# Patient Record
Sex: FEMALE | Race: WHITE | Employment: OTHER | URBAN - METROPOLITAN AREA
[De-identification: names, ages, dates, MRNs, and addresses within clinical notes are randomized per-mention and may not be internally consistent; named-entity substitution may affect disease eponyms.]

---

## 2017-12-12 ENCOUNTER — HOSPITAL ENCOUNTER (OUTPATIENT)
Dept: PHYSICAL THERAPY | Age: 72
Discharge: HOME | End: 2017-12-12
Payer: MEDICARE

## 2017-12-12 PROCEDURE — G8984 CARRY CURRENT STATUS: HCPCS

## 2017-12-12 PROCEDURE — 97110 THERAPEUTIC EXERCISES: CPT

## 2017-12-12 PROCEDURE — G8985 CARRY GOAL STATUS: HCPCS

## 2017-12-12 PROCEDURE — 97162 PT EVAL MOD COMPLEX 30 MIN: CPT

## 2017-12-12 NOTE — PROGRESS NOTES
[address] [facility] [place] PHYSICAL THERAPY  [facility], [address] ,[address], [address]  Phone: [phone]  Fax: [phone] / [address] [address] PHYSICAL THERAPY SERVICES  Patient Name: Dama Litten : 1945   Medical   Diagnosis: Right shoulder pain [M25.511]  Left shoulder pain [M25.512] Treatment Diagnosis: B shoulder pain   Onset Date: 2017     Referral Source: Daniella Arevalo MD  [facility]): 2017   Prior Hospitalization: See medical history Provider #: [id number]   Prior Level of Function: Manageable sx with ADLs   Comorbidities: HTN, current h/o L wrist pain    Medications: Verified on Patient Summary List   The Plan of Care and following information is based on the information from the initial evaluation.   ==================================================================================  Assessment / key information:  Patient is a 81 y.o. female who presents to [facility] with Dx of B shoulder pain (she is RHD). Patient reports chronic h/o B shoulder pain which were of unknown etiology. She reports worsening of R shoulder sx after an incident on a plane when she was putting her bag into an overhead bin. 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 Sx improve with rest.  She reports significant difficulty with sleeping at night uninterrupted > 1.5 hours & typically sleeps upright on her soft for comfort. She also suffered from a recent L wrist sprain given difficulty pushing up from her bed using her shoulder. Average reported pain level at 4/10, 9/10 at worst & 0/10 at best. Upon objective evaluation patient demonstrates flex/scaption: 120 deg R, 98 o L, ABD 75 on R, 55 on L, IR to level of L4 bilaterally.   She demonstrates grossly 75% PROM of B shoulder with ERP noted throughout all planes. Unable to assess MMT of flexion/ABD in standing due to pain although ER/IR in supine was grossly 4+/5 and pain-free. Elbow MMT was 5/5 & pain-free bilaterally. Impingement tests were (+) bilaterally for sx reproduction with mod TTP along bicipital groove R>L. Fair/poor tolerance to supinelying for assessment today given c/o pain with this positioning. Patient can benefit from PT interventions to improve strength, decrease pain & improve posture to facilitate ADLs & overall functional status.   ==================================================================================   Eval Complexity: History MEDIUM  Complexity : 1-2 comorbidities / personal factors will impact the outcome/ POC ;  Examination  HIGH Complexity : 4+ Standardized tests and measures addressing body structure, function, activity limitation and / or participation in recreation ; Presentation MEDIUM Complexity : Evolving with changing characteristics ; Decision Making MEDIUM Complexity : FOTO score of 26-74; Overall Complexity MEDIUM  Problem List: pain affecting function, decrease ROM, decrease strength, decrease ADL/ functional abilitiies, decrease activity tolerance, decrease flexibility/ joint mobility, decrease transfer abilities and other FOTO 47 points   Treatment Plan may include any combination of the following: Therapeutic exercise, Therapeutic activities, Neuromuscular re-education, Physical agent/modality, Manual therapy, Aquatic therapy, Patient education, Self Care training, Functional mobility training, Home safety training and Other: DN prn  Patient / Family readiness to learn indicated by: asking questions, trying to perform skills and interest  Persons(s) to be included in education: patient (P)  Barriers to Learning/Limitations: None  Measures taken:    Patient Goal (s): \"prepare for surgery\"   Patient self reported health status: good  Rehabilitation Potential: good   Short Term Goals:  To be accomplished in  2  weeks:  1) Establish HEP to prevent further disability. 2) Patient will report decreased c/o pain to < or = 3/10 to facilitate dressing with manageable sx in B shoulders. 3) Improve FOTO score from 47 points to > or = 55 points indicating improved tolerance with ADLs in regards to B shoulders.  Long Term Goals: To be accomplished in  4  weeks:  1) Improve FOTO score from 55 points to > or = 65 points indicating improved tolerance with ADLs in regards to B shoulders. 2) Improve B shoulder AROM for flex/scaption to 130 degrees so ROM is available for dressing activities and/or overhead reaching. 3) Improve overall strength of B shoulder ABD/flex to 5-/5 so strength is available for return to light lifting activities at work/home. 4) Patient to report 50% improvement with her ability to tolerate supinelying with manageable sx in B shoulders. Frequency / Duration:   Patient to be seen  2-3  times per week for 4  weeks:  Patient / Caregiver education and instruction: self care, activity modification, brace/ splint application and exercises  G-Codes (GP): Carry [id number] Current  CK= 40-59%    Goal  CJ= 20-39%. The severity rating is based on the FOTO Score    Therapist Signature: DORA Matthew cert MDT Date: 50/12/1563   Certification Period: 12-12-17 to 3-10-18 Time: 3:14 PM   ===========================================================================================  I certify that the above Physical Therapy Services are being furnished while the patient is under my care. I agree with the treatment plan and certify that this therapy is necessary. Physician Signature:        Date:       Time:     Please sign and return to [facility] or you may fax the signed copy to [phone]. Thank you.

## 2017-12-13 NOTE — PROGRESS NOTES
PHYSICAL THERAPY - DAILY TREATMENT NOTE    Patient Name: Rin Miramontes        Date: 2017  : 1945   YES Patient  Verified  Visit #:     Insurance: Payor: [place] MEDICARE / Plan: VA MEDICARE PART A & B / Product Type: Medicare /      In time: 3:10 P Out time: 4 P   Total Treatment Time: 50     Medicare Time Tracking (below)   Total Timed Codes (min):  50 1:1 Treatment Time:  50     TREATMENT AREA =  Right shoulder pain [M25.511]  Left shoulder pain [M25.512]    SUBJECTIVE  Pain Level (on 0 to 10 scale):  3  / 10   Medication Changes/New allergies or changes in medical history, any new surgeries or procedures? NO    If yes, update Summary List   Subjective Functional Status/Changes:  []  No changes reported     See POC          OBJECTIVE  Modalities Rationale: PD   min [] Estim, type/location:                                      []  att     []  unatt     []  w/US     []  w/ice    []  w/heat    min []  Mechanical Traction: type/lbs                   []  pro   []  sup   []  int   []  cont    []  before manual    []  after manual    min []  Ultrasound, settings/location:      min []  Iontophoresis w/ dexamethasone, location:                                               []  take home patch       []  in clinic    min []  Ice     []  Heat    location/position:     min []  Vasopneumatic Device, press/temp:     min []  Other:    [] Skin assessment post-treatment (if applicable):    []  intact    []  redness- no adverse reaction     []redness  adverse reaction:        10 min Therapeutic Exercise:  [x]  See flow sheet   Rationale:      increase ROM and increase strength to improve the patients ability to return to light lifting      min Manual Therapy:    Rationale:          min Therapeutic Activity:    Rationale:         min Neuromuscular Re-ed:    Rationale:       min Gait Training:    Rationale:       min Patient Education:  YES  Reviewed HEP   []  Progressed/Changed HEP based on:         Other Objective/Functional Measures:    Pt educated on post capsule stretch & demonstrated placement/positioning in semi-reclined position with pillows supported to improve sleeping tolerance     Post Treatment Pain Level (on 0 to 10) scale:   3  / 10     ASSESSMENT  Assessment/Changes in Function:     See POC     []  See Progress Note/Recertification   Patient will continue to benefit from skilled PT services to modify and progress therapeutic interventions, address functional mobility deficits, address ROM deficits, address strength deficits and instruct in home and community integration to attain remaining goals.    Progress toward goals / Updated goals:    Progressing towards goals established at [address] [facility] [address]  [x]  Upgrade activities as tolerated YES Continue plan of care   []  Discharge due to :    []  Other:      Therapist: Karuna Wade PT    Date: 12/12/2017 Time: 8:01 PM     Future Appointments  Date Time Provider Earnest Barry   12/19/2017 11:00 AM Karuna Wade PT [facility]   12/22/2017 9:30 AM Nichole Stone [facility]   12/26/2017 9:00 AM Karuna Wade PT [facility]   12/28/2017 9:30 AM Karuna Wade PT [facility]   1/2/2018 10:30 AM Karuna Wade PT [facility]   1/5/2018 1:00 PM Karuna Wade PT [facility]   1/9/2018 12:00 PM Karuna Wade PT [facility]   1/12/2018 1:00 PM Alberta Keith, PT [facility]

## 2017-12-19 ENCOUNTER — HOSPITAL ENCOUNTER (OUTPATIENT)
Dept: PHYSICAL THERAPY | Age: 72
Discharge: HOME | End: 2017-12-19
Payer: MEDICARE

## 2017-12-19 PROCEDURE — 97110 THERAPEUTIC EXERCISES: CPT

## 2017-12-19 NOTE — PROGRESS NOTES
PHYSICAL THERAPY - DAILY TREATMENT NOTE    Patient Name: Juliane Bamberger        Date: 2017  : 1945   YES Patient  Verified  Visit #:      12  Insurance: Payor: [place] MEDICARE / Plan: VA MEDICARE PART A & B / Product Type: Medicare /      In time: 11:05 A Out time: 12:00 P   Total Treatment Time: 50     Medicare Time Tracking (below)   Total Timed Codes (min):  40 1:1 Treatment Time:  40     TREATMENT AREA =  Right shoulder pain [M25.511]  Left shoulder pain [M25.512]    SUBJECTIVE  Pain Level (on 0 to 10 scale):  3-8  / 10   Medication Changes/New allergies or changes in medical history, any new surgeries or procedures? NO    If yes, update Summary List   Subjective Functional Status/Changes:  []  No changes reported     Patient reports consult with Dr. Jacqueline Samuel was RS for tomorrow.           OBJECTIVE  Modalities Rationale:     decrease pain to improve patient's ability to return to pain-free use of B shoulders with ADLs   min [] Estim, type/location:                                      []  att     []  unatt     []  w/US     []  w/ice    []  w/heat    min []  Mechanical Traction: type/lbs                   []  pro   []  sup   []  int   []  cont    []  before manual    []  after manual    min []  Ultrasound, settings/location:      min []  Iontophoresis w/ dexamethasone, location:                                               []  take home patch       []  in clinic   10 min [x]  Ice     []  Heat    location/position: Seated to B shoulders     min []  Vasopneumatic Device, press/temp:     min []  Other:    [] Skin assessment post-treatment (if applicable):    []  intact    []  redness- no adverse reaction     []redness  adverse reaction:        40 min Therapeutic Exercise:  [x]  See flow sheet   Rationale:      increase ROM and increase strength to improve the patients ability to return to sx free ADLs      min Manual Therapy:    Rationale:          min Therapeutic Activity:    Rationale: min Neuromuscular Re-ed:    Rationale:       min Gait Training:    Rationale:       min Patient Education:  YES  Reviewed HEP   []  Progressed/Changed HEP based on: Other Objective/Functional Measures:    Initiated TE per flow sheet     Post Treatment Pain Level (on 0 to 10) scale:   3  / 10     ASSESSMENT  Assessment/Changes in Function:     Fair tolerance to TE, pt unable to tolerate any AAROM with dowel in supine as well as S/L ABD or 1/2 FR/towel stretch, all limited by pain B shoulders     []  See Progress Note/Recertification   Patient will continue to benefit from skilled PT services to modify and progress therapeutic interventions, address functional mobility deficits, address ROM deficits, address strength deficits, assess and modify postural abnormalities and instruct in home and community integration to attain remaining goals.    Progress toward goals / Updated goals:    Progressing towards goals established at [address] [facility] [address]  [x]  Upgrade activities as tolerated YES Continue plan of care   []  Discharge due to :    []  Other:      Therapist: Alex Mayen PT    Date: 12/19/2017 Time: 11:51 AM     Future Appointments  Date Time Provider Earnest Barry   12/21/2017 11:00 AM Alex Mayen, PT [facility]   12/26/2017 9:00 AM Alex Mayen, PT [facility]   12/28/2017 9:30 AM Alex Mayen, PT [facility]   1/2/2018 10:30 AM Alex Mayen, PT [facility]   1/5/2018 1:00 PM Alex Mayen PT [facility]   1/9/2018 12:00 PM Alex Mayen, PT [facility]   1/12/2018 1:00 PM Alberta Murdock, PT [facility]

## 2017-12-21 ENCOUNTER — HOSPITAL ENCOUNTER (OUTPATIENT)
Dept: PHYSICAL THERAPY | Age: 72
Discharge: HOME | End: 2017-12-21
Payer: MEDICARE

## 2017-12-21 PROCEDURE — 97110 THERAPEUTIC EXERCISES: CPT

## 2017-12-21 NOTE — PROGRESS NOTES
PHYSICAL THERAPY - DAILY TREATMENT NOTE    Patient Name: Katie Corona        Date: 2017  : 1945   YES Patient  Verified  Visit #:   3   of   12  Insurance: Payor: Sindhu Beans / Plan: VA MEDICARE PART A & B / Product Type: Medicare /      In time: 11 A Out time: 12:05 P   Total Treatment Time: 55     Medicare Time Tracking (below)   Total Timed Codes (min):  45 1:1 Treatment Time:  40     TREATMENT AREA =  Right shoulder pain [M25.511]  Left shoulder pain [M25.512]    SUBJECTIVE  Pain Level (on 0 to 10 scale):  6  / 10 L, 3/10 on R   Medication Changes/New allergies or changes in medical history, any new surgeries or procedures? NO    If yes, update Summary List   Subjective Functional Status/Changes:  []  No changes reported     Patient reports that she had a consult with Dr. Teri Montejo & will have an injection guiding by X-ray.            OBJECTIVE  Modalities Rationale:     decrease pain to improve patient's ability to return to pain-free use of B shoulders with dressing   min [] Estim, type/location:                                      []  att     []  unatt     []  w/US     []  w/ice    []  w/heat    min []  Mechanical Traction: type/lbs                   []  pro   []  sup   []  int   []  cont    []  before manual    []  after manual    min []  Ultrasound, settings/location:      min []  Iontophoresis w/ dexamethasone, location:                                               []  take home patch       []  in clinic   10 min [x]  Ice     []  Heat    location/position: Seated to B shoulders     min []  Vasopneumatic Device, press/temp:     min []  Other:    [] Skin assessment post-treatment (if applicable):    []  intact    []  redness- no adverse reaction     []redness  adverse reaction:        45/  40 min Therapeutic Exercise:  [x]  See flow sheet   Rationale:      increase ROM and increase strength to improve the patients ability to return to light lifting      min Manual Therapy:    Rationale: min Therapeutic Activity:    Rationale:         min Neuromuscular Re-ed:    Rationale:       min Gait Training:    Rationale:       min Patient Education:  YES  Reviewed HEP   []  Progressed/Changed HEP based on: Other Objective/Functional Measures: Added pulleys & finger ladder, instructions in [employer] for pain relief     Post Treatment Pain Level (on 0 to 10) scale:   4 / 10     ASSESSMENT  Assessment/Changes in Function:     Improved tolerance to TE with AAROM today      []  See Progress Note/Recertification   Patient will continue to benefit from skilled PT services to modify and progress therapeutic interventions, address functional mobility deficits, address ROM deficits, address strength deficits, assess and modify postural abnormalities and instruct in home and community integration to attain remaining goals.    Progress toward goals / Updated goals:    Progressing towards STG 2     PLAN  [x]  Upgrade activities as tolerated YES Continue plan of care   []  Discharge due to :    []  Other:      Therapist: Alvin Freire PT    Date: 12/21/2017 Time: 2:24 PM     Future Appointments  Date Time Provider Earnest Barry   12/26/2017 12:30 PM Mandie Olivera [facility]   12/28/2017 9:30 AM Alvin Freire PT [facility]   1/2/2018 10:30 AM Alvin Freire PT [facility]   1/5/2018 1:00 PM Alvin Freire PT [facility]   1/9/2018 12:00 PM Alvin Freire PT [facility]   1/12/2018 1:00 PM Alberta Kaminski, PT [facility]

## 2017-12-22 ENCOUNTER — APPOINTMENT (OUTPATIENT)
Dept: PHYSICAL THERAPY | Age: 72
End: 2017-12-22
Payer: MEDICARE

## 2017-12-26 ENCOUNTER — APPOINTMENT (OUTPATIENT)
Dept: PHYSICAL THERAPY | Age: 72
End: 2017-12-26
Payer: MEDICARE

## 2017-12-28 ENCOUNTER — APPOINTMENT (OUTPATIENT)
Dept: PHYSICAL THERAPY | Age: 72
End: 2017-12-28
Payer: MEDICARE

## 2018-01-02 ENCOUNTER — HOSPITAL ENCOUNTER (OUTPATIENT)
Dept: PHYSICAL THERAPY | Age: 73
Discharge: HOME | End: 2018-01-02
Payer: MEDICARE

## 2018-01-02 PROCEDURE — 97110 THERAPEUTIC EXERCISES: CPT | Performed by: PHYSICAL THERAPIST

## 2018-01-02 NOTE — PROGRESS NOTES
PHYSICAL THERAPY - DAILY TREATMENT NOTE    Patient Name: Mark Duncan        Date: 2018  : 1945   YES Patient  Verified  Visit #:   3   of   12  Insurance: Payor: Christopher Mins / Plan: VA MEDICARE PART A & B / Product Type: Medicare /      In time: 10:25 Out time: 11:20   Total Treatment Time: 50     Medicare Time Tracking (below)   Total Timed Codes (min):  25 1:1 Treatment Time:  25     TREATMENT AREA = Right shoulder pain [M25.511]  Left shoulder pain [M25.512]    SUBJECTIVE  Pain Level (on 0 to 10 scale):  4-5  / 10   Medication Changes/New allergies or changes in medical history, any new surgeries or procedures? NO    If yes, update Summary List   Subjective Functional Status/Changes:  []  No changes reported     Pt reports having an outpatient procedeure where fluid was injected into her left shoulder under flouroscopy over about a 30 minute span to stretch adhesions last Tuesday. She has been sore, but notes increased ROM the past week.           OBJECTIVE  Modalities Rationale:     decrease edema, decrease inflammation and decrease pain to improve patient's ability to prevent soreness   min [] Estim, type/location:                                      []  att     []  unatt     []  w/US     []  w/ice    []  w/heat    min []  Mechanical Traction: type/lbs                   []  pro   []  sup   []  int   []  cont    []  before manual    []  after manual    min []  Ultrasound, settings/location:      min []  Iontophoresis w/ dexamethasone, location:                                               []  take home patch       []  in clinic   10 min [x]  Ice     []  Heat    location/position: B shoulders - seated after treatment    min []  Vasopneumatic Device, press/temp:     min []  Other:    [] Skin assessment post-treatment (if applicable):    []  intact    []  redness- no adverse reaction     []redness  adverse reaction:        40(25) min Therapeutic Exercise:  [x]  See flow sheet   Rationale: increase ROM, increase strength and improve coordination to improve the patients ability to use UEs without residual pain/soreness      min Therapeutic Activity:         min Neuromuscular Re-ed:         min Gait Training:       min Patient Education:  YES  Reviewed HEP   []  Progressed/Changed HEP based on: Other Objective/Functional Measures: Added supine bilateral shoulder flexion stretch with interlocked fingers     Post Treatment Pain Level (on 0 to 10) scale:   0  / 10     ASSESSMENT  Assessment/Changes in Function:     Pt notes having increased ROM in her shoulder after recent procedure, and tolerated new flexion stretch well.         []  See Progress Note/Recertification   Patient will continue to benefit from skilled PT services to modify and progress therapeutic interventions, address functional mobility deficits, address ROM deficits, address strength deficits, analyze and address soft tissue restrictions, analyze and cue movement patterns, analyze and modify body mechanics/ergonomics and assess and modify postural abnormalities to attain remaining goals. Progress toward goals / Updated goals: Will cotinue to progress ROM and strength as tolerated.      PLAN  [x]  Upgrade activities as tolerated YES Continue plan of care   []  Discharge due to :    []  Other:      Therapist: Justin Severs, PT    Date: 1/2/2018 Time: 10:04 AM     Future Appointments  Date Time Provider Earnest Barry   1/2/2018 10:30 AM Justin Severs, [place] [facility]   1/5/2018 1:00 PM Leonora Shoulders, [place] [facility]   1/9/2018 12:00 PM Leonora Shoulders, PT [facility]   1/12/2018 1:00 PM Leonora Shoulders, PT [facility]

## 2018-01-05 ENCOUNTER — APPOINTMENT (OUTPATIENT)
Dept: PHYSICAL THERAPY | Age: 73
End: 2018-01-05
Payer: MEDICARE

## 2018-01-09 ENCOUNTER — APPOINTMENT (OUTPATIENT)
Dept: PHYSICAL THERAPY | Age: 73
End: 2018-01-09
Payer: MEDICARE

## 2018-01-12 ENCOUNTER — HOSPITAL ENCOUNTER (OUTPATIENT)
Dept: PHYSICAL THERAPY | Age: 73
Discharge: HOME | End: 2018-01-12
Payer: MEDICARE

## 2018-01-12 PROCEDURE — 97110 THERAPEUTIC EXERCISES: CPT

## 2018-01-15 NOTE — PROGRESS NOTES
PHYSICAL THERAPY - DAILY TREATMENT NOTE    Patient Name: Emilie Maloney        Date: 2018  : 1945   YES Patient  Verified  Visit #:     Insurance: Payor: VA MEDICARE / Plan: VA MEDICARE PART A & B / Product Type: Medicare /      In time: 1:00 P Out time: 2:00 P   Total Treatment Time: 55     Medicare Time Tracking (below)   Total Timed Codes (min):  45 1:1 Treatment Time:  25     TREATMENT AREA =  Right shoulder pain [M25.511]  Left shoulder pain [M25.512]    SUBJECTIVE  Pain Level (on 0 to 10 scale):  1  / 10 on L, 6/10 on R shoulder    Medication Changes/New allergies or changes in medical history, any new surgeries or procedures? NO    If yes, update Summary List   Subjective Functional Status/Changes:  []  No changes reported     Patient reports her L shoulder has been feeling much better since her shot.            OBJECTIVE  Modalities Rationale:     decrease pain to improve patient's ability to return to pain-free lifting   min [] Estim, type/location:                                      []  att     []  unatt     []  w/US     []  w/ice    []  w/heat    min []  Mechanical Traction: type/lbs                   []  pro   []  sup   []  int   []  cont    []  before manual    []  after manual    min []  Ultrasound, settings/location:      min []  Iontophoresis w/ dexamethasone, location:                                               []  take home patch       []  in clinic   10 min [x]  Ice     []  Heat    location/position: B shoulders in sitting    min []  Vasopneumatic Device, press/temp:     min []  Other:    [] Skin assessment post-treatment (if applicable):    []  intact    []  redness- no adverse reaction     []redness  adverse reaction:        45/  25 min Therapeutic Exercise:  [x]  See flow sheet   Rationale:      increase ROM and increase strength to improve the patients ability to return to light lifting at home      min Manual Therapy:    Rationale:          min Therapeutic Activity:    Rationale:         min Neuromuscular Re-ed:    Rationale:       min Gait Training:    Rationale:       min Patient Education:  YES  Reviewed HEP   []  Progressed/Changed HEP based on: Other Objective/Functional Measures:    Unable to tolerance R shoulder ER/ABD in S/L due to pain      Post Treatment Pain Level (on 0 to 10) scale:   0  / 10 on L, 3/10 on R     ASSESSMENT  Assessment/Changes in Function:     Fair tolerance to TE on R shoulder, limited by pain, good sx reduction on L shoulder with recent injection      []  See Progress Note/Recertification   Patient will continue to benefit from skilled PT services to modify and progress therapeutic interventions, address functional mobility deficits, address ROM deficits, address strength deficits, assess and modify postural abnormalities and instruct in home and community integration to attain remaining goals.    Progress toward goals / Updated goals:    Progressing towards STG 2, 3     PLAN  [x]  Upgrade activities as tolerated YES Continue plan of care   []  Discharge due to :    [x]  Other: Re-assess for PN n/v     Therapist: Sotero Marshall PT    Date: 1/12/2018 Time: 2:00 PM     Future Appointments  Date Time Provider Earnest Barry   1/19/2018 10:30 AM Sotero Marshall PT [facility]   1/23/2018 11:00 AM Sotero Marshall PT [facility]   1/25/2018 4:00 PM Sotero Marshall PT [facility]   1/30/2018 10:30 AM Sotero Marshall PT [facility]   2/2/2018 1:00 PM Sotero Marsahll PT [facility]

## 2018-01-19 ENCOUNTER — HOSPITAL ENCOUNTER (OUTPATIENT)
Dept: PHYSICAL THERAPY | Age: 73
Discharge: HOME | End: 2018-01-19
Payer: MEDICARE

## 2018-01-19 PROCEDURE — 97110 THERAPEUTIC EXERCISES: CPT

## 2018-01-19 PROCEDURE — G8984 CARRY CURRENT STATUS: HCPCS

## 2018-01-19 PROCEDURE — G8985 CARRY GOAL STATUS: HCPCS

## 2018-01-19 NOTE — PROGRESS NOTES
[address]  [facility]  [facility]  [facility], [address] ,[address], [address]  Phone: [phone]  Fax: [phone] OF CARE/RECERTIFICATION FOR PHYSICAL THERAPY          Patient Name: Sylvia Stone : 1945   Treatment/Medical Diagnosis: Right shoulder pain [M25.511]  Left shoulder pain [M25.512]   Onset Date: 2017    Referral Source: MD Demond Mixon MD [facility]): 17   Prior Hospitalization: See Medical History Provider #: [id number]   Prior Level of Function: Manageable sx with ADLs   Comorbidities: HTN, current h/o L wrist pain    Medications: Verified on Patient Summary List   [facility]: 6 Missed Visits: 1     Goal/Measure of Progress Goal Met? 1.  Establish HEP to prevent further disability. Status at last Eval: NA Current Status: HEP established yes   2. Patient will report decreased c/o pain to < or = 3/10 to facilitate dressing with manageable sx in B shoulders. Status at last Eval: Average 4/10  At best 9/10 Current Status: R average 6/10, At worst 9/10   At best 3/10   L average 1/10  At worst 2/10  At best 0/10 progressing   3. Improve B shoulder AROM for flex/scaption to 130 degrees so ROM is available for dressing activities and/or overhead reaching. Status at last Eval: R 120 deg  L 98 deg Current Status: R 130 deg  L 150 deg  yes     Key Functional Changes/Progress: Ms. Tiffany Kincaid has made good progress with PT & reports intermittent c/o pain in L shoulder with significant relief in L shoulder pain since most recent injection. She now reports 90% improvement in overall functional use of L shoulder with grossly WFL L shoulder AROM, L shoulder strength at 4+ to 5-/5 overall. She is now to sleep on L shoulder without pain at night. R shoulder AROM continues to be limited by pain with very limited tolerance to progression of TE in clinic given c/o pain on R shoulder. Problem List: pain affecting function, decrease ROM, decrease strength, decrease ADL/ functional abilitiies, decrease activity tolerance and other FOTO 45   Treatment Plan may include any combination of the following: Therapeutic exercise, Therapeutic activities, Neuromuscular re-education, Physical agent/modality, Manual therapy, Patient education, Self Care training and Functional mobility training  Patient Goal(s) has been updated and includes:  \"prepare for surgery\"/decrease pain    Goals for this certification period include and are to be achieved in   4  weeks:  1) Improve FOTO score from 45 points to > or = 55 points indicating improved tolerance with ADLs in regards to B shoulders  2) Improve R shoulder AROM for flex/scaption to 140 degrees so ROM is available for dressing activities and/or overhead reaching. 3) Improve overall strength of B shoulder to 4/5 so strength is available for return to light lifting activities at work/home. 4) Patient to report 75% improvement in overall function in preparation for return to recreational activities with manageable sx in R shoulder. Frequency / Duration:   Patient to be seen   2-3   times per week for   4    weeks:  G-Codes (GP): Carry [id number] Current  CK= 40-59%    Goal  CJ= 20-39%. The severity rating is based on the FOTO Score    Assessments/Recommendations: Patient to continue with PT, please indicate your recommendation regarding persistent R shoulder pain. If you have any questions/comments please contact us directly at [phone]. Thank you for allowing us to assist in the care of your patient. Therapist Signature: DORA Stockton, cert MDT Date: 5/20/3303   Certification Period:  Reporting Period: 1-19-18 to 4-17-18 12-12-17 to 1-19-18 Time: 10:43 AM   NOTE TO PHYSICIAN:  PLEASE COMPLETE THE ORDERS BELOW AND FAX TO   [facility] Physical Therapy: [phone].   If you are unable to process this request in 24 hours please contact our office: [phone].    ___ I have read the above report and request that my patient continue as recommended.   ___ I have read the above report and request that my patient continue therapy with the following changes/special instructions: ________________________________________________   ___ I have read the above report and request that my patient be discharged from therapy.      Physician Signature:        Date:       Time:

## 2018-01-19 NOTE — PROGRESS NOTES
PHYSICAL THERAPY - DAILY TREATMENT NOTE    Patient Name: Miller Wright        Date: 2018  : 1945   YES Patient  Verified  Visit #:   6   of   12  Insurance: Payor: Laya Lindquist / Plan: [place] MEDICARE PART A & B / Product Type: Medicare /      In time: 10:25 A Out time: 11:25 A   Total Treatment Time: 54     Medicare Time Tracking (below)   Total Timed Codes (min):  45 1:1 Treatment Time:  25     TREATMENT AREA =  Right shoulder pain [M25.511]  Left shoulder pain [M25.512]    SUBJECTIVE  Pain Level (on 0 to 10 scale):  L 0/10, R 6/10   Medication Changes/New allergies or changes in medical history, any new surgeries or procedures?     NO    If yes, update Summary List   Subjective Functional Status/Changes:  []  No changes reported     See re-cert to MD          OBJECTIVE  Modalities Rationale:     decrease pain to improve patient's ability to return to pain-free use of R shoulder with ADLs    min [] Estim, type/location:                                      []  att     []  unatt     []  w/US     []  w/ice    []  w/heat    min []  Mechanical Traction: type/lbs                   []  pro   []  sup   []  int   []  cont    []  before manual    []  after manual    min []  Ultrasound, settings/location:      min []  Iontophoresis w/ dexamethasone, location:                                               []  take home patch       []  in clinic   10 min [x]  Ice     []  Heat    location/position: Seated to B shoulders     min []  Vasopneumatic Device, press/temp:     min []  Other:    [] Skin assessment post-treatment (if applicable):    []  intact    []  redness- no adverse reaction     []redness  adverse reaction:        45/  25 min Therapeutic Exercise:  [x]  See flow sheet   Rationale:      increase ROM and increase strength to improve the patients ability to return to pain-free use of R shoulder with ADLs       min Manual Therapy:    Rationale:          min Therapeutic Activity:    Rationale:         min Neuromuscular Re-ed:    Rationale:       min Gait Training:    Rationale:       min Patient Education:  YES  Reviewed HEP   []  Progressed/Changed HEP based on: Other Objective/Functional Measures:    FOTO slight decrease in score (see PN)  AROM: R 120 deg, L 150 deg (standing scaption)     Post Treatment Pain Level (on 0 to 10) scale:   0  / 10 on L, 6/10 R      ASSESSMENT  Assessment/Changes in Function:     Slow progress with sx reduction in regards to R shoulder, good progress with ROM/strength since injection on L shoulder      []  See Progress Note/Recertification   Patient will continue to benefit from skilled PT services to modify and progress therapeutic interventions, address functional mobility deficits, address ROM deficits, address strength deficits, assess and modify postural abnormalities and instruct in home and community integration to attain remaining goals.    Progress toward goals / Updated goals:    Progressing towards STG 2, STG 1, 3 met      PLAN  [x]  Upgrade activities as tolerated YES Continue plan of care   []  Discharge due to :    [x]  Other: Issued copy of PN for f/u with MD next week      Therapist: Glendy Asher PT    Date: 1/19/2018 Time: 10:43 AM     Future Appointments  Date Time Provider Earnest Barry   1/23/2018 11:00 AM Glendy Asher PT [facility]   1/25/2018 4:00 PM Sabrina Cardona [facility]   1/30/2018 10:30 AM Glendy Asher, PT [facility]   2/2/2018 1:00 PM Glendy Asher, PT [facility]

## 2018-01-23 ENCOUNTER — APPOINTMENT (OUTPATIENT)
Dept: PHYSICAL THERAPY | Age: 73
End: 2018-01-23
Payer: MEDICARE

## 2018-01-25 ENCOUNTER — APPOINTMENT (OUTPATIENT)
Dept: PHYSICAL THERAPY | Age: 73
End: 2018-01-25
Payer: MEDICARE

## 2018-02-02 ENCOUNTER — HOSPITAL ENCOUNTER (OUTPATIENT)
Dept: PHYSICAL THERAPY | Age: 73
Discharge: HOME | End: 2018-02-02
Payer: MEDICARE

## 2018-02-02 ENCOUNTER — APPOINTMENT (OUTPATIENT)
Dept: PHYSICAL THERAPY | Age: 73
End: 2018-02-02
Payer: MEDICARE

## 2018-02-02 PROCEDURE — 97110 THERAPEUTIC EXERCISES: CPT

## 2018-02-02 NOTE — PROGRESS NOTES
PHYSICAL THERAPY - DAILY TREATMENT NOTE    Patient Name: Tammie Rosas        Date: 2018  : 1945   YES Patient  Verified  Visit #:     Insurance: Payor: [place] MEDICARE / Plan: VA MEDICARE PART A & B / Product Type: Medicare /      In time: 1:55pm Out time: 2:55pm   Total Treatment Time: 60     Medicare Time Tracking (below)   Total Timed Codes (min):  50 1:1 Treatment Time:  35     TREATMENT AREA =  Right shoulder pain [M25.511]  Left shoulder pain [M25.512]  SUBJECTIVE  Pain Level (on 0 to 10 scale):  3  / 10   Medication Changes/New allergies or changes in medical history, any new surgeries or procedures? NO    If yes, update Summary List   Subjective Functional Status/Changes:  []  No changes reported     Pt reports recent injection in R shoulder with improvements in functional use overall. OBJECTIVE  Modalities Rationale:     decrease inflammation, decrease pain and increase tissue extensibility to improve patient's ability to perform functional ADLs   min [] Estim, type/location:                                      []  att     []  unatt     []  w/US     []  w/ice    []  w/heat    min []  Mechanical Traction: type/lbs                   []  pro   []  sup   []  int   []  cont    []  before manual    []  after manual    min []  Ultrasound, settings/location:      min []  Iontophoresis w/ dexamethasone, location:                                               []  take home patch       []  in clinic   10 min [x]  Ice     []  Heat    location/position: Supine to B Shoulder post treatment.     min []  Vasopneumatic Device, press/temp:     min []  Other:    [] Skin assessment post-treatment (if applicable):    []  intact    []  redness- no adverse reaction     []redness  adverse reaction:        50/35 min Therapeutic Exercise:  [x]  See flow sheet   Rationale:      increase ROM and increase strength to improve the patients ability to regain functional mobility of B shoulders  for [place] reaching. min Manual Therapy:    Rationale:      decrease pain, increase ROM, increase tissue extensibility and decrease trigger points to improve the patient's ability to regain full functional mobility     min Therapeutic Activity:    Rationale:    increase strength, improve coordination and increase proprioception to improve the patients ability to      min Neuromuscular Re-ed:    Rationale:    improve coordination, improve balance and increase proprioception to improve the patients ability to      min Gait Training:    Rationale:       min Patient Education:  YES  Reviewed HEP   []  Progressed/Changed HEP based on: Other Objective/Functional Measures:    Slowly progressed therex with R shoulder due to dec pain overall. Added scaption to 90 degrees. Post Treatment Pain Level (on 0 to 10) scale:   3  / 10     ASSESSMENT  Assessment/Changes in Function:   VC's on inhibiting B UT's throughout today's visit. []  See Progress Note/Recertification   Patient will continue to benefit from skilled PT services to modify and progress therapeutic interventions, address functional mobility deficits, address ROM deficits, address strength deficits, analyze and address soft tissue restrictions, analyze and cue movement patterns, analyze and modify body mechanics/ergonomics and assess and modify postural abnormalities to attain remaining goals. Progress toward goals / Updated goals:    Progressing towards newly established LTG's.      PLAN  [x]  Upgrade activities as tolerated YES Continue plan of care   []  Discharge due to :    []  Other:      Therapist: MONA Rico    Date: 2/2/2018 Time: 5:12 PM     Future Appointments  Date Time Provider Earnest Barry   2/7/2018 12:30 PM Maggi Cordero [facility]   2/9/2018 1:00 PM Jan Cook PT [facility]   2/12/2018 11:30 AM Jan Cook PT [facility]   2/16/2018 1:00 PM Jan Cook PT [facility]

## 2018-02-07 ENCOUNTER — HOSPITAL ENCOUNTER (OUTPATIENT)
Dept: PHYSICAL THERAPY | Age: 73
Discharge: HOME | End: 2018-02-07
Payer: MEDICARE

## 2018-02-07 PROCEDURE — 97110 THERAPEUTIC EXERCISES: CPT

## 2018-02-07 NOTE — PROGRESS NOTES
PHYSICAL THERAPY - DAILY TREATMENT NOTE    Patient Name: Chanelle Hardy        Date: 2018  : 1945   YES Patient  Verified  Visit #:     Insurance: Payor: Jayashree Reed / Plan: VA MEDICARE PART A & B / Product Type: Medicare /      In time: 12:30pm Out time: 1:30pm   Total Treatment Time: 60     Medicare Time Tracking (below)   Total Timed Codes (min):  50 1:1 Treatment Time:  40     TREATMENT AREA =  Right shoulder pain [M25.511]  Left shoulder pain [M25.512]  SUBJECTIVE  Pain Level (on 0 to 10 scale):  2  / 10   Medication Changes/New allergies or changes in medical history, any new surgeries or procedures? NO    If yes, update Summary List   Subjective Functional Status/Changes:  []  No changes reported     \"I was out doing some yardwork this morning. \" Pt reports she felt mild discomfort down R bicep post yardwork. OBJECTIVE  Modalities Rationale:     decrease inflammation, decrease pain and increase tissue extensibility to improve patient's ability to perform functional ADLs   min [] Estim, type/location:                                      []  att     []  unatt     []  w/US     []  w/ice    []  w/heat    min []  Mechanical Traction: type/lbs                   []  pro   []  sup   []  int   []  cont    []  before manual    []  after manual    min []  Ultrasound, settings/location:      min []  Iontophoresis w/ dexamethasone, location:                                               []  take home patch       []  in clinic    min []  Ice     []  Heat    location/position:     min []  Vasopneumatic Device, press/temp:     min []  Other:    [] Skin assessment post-treatment (if applicable):    []  intact    []  redness- no adverse reaction     []redness  adverse reaction:        50/40 min Therapeutic Exercise:  [x]  See flow sheet   Rationale:      increase ROM and increase strength to improve the patients ability to regain functional mobility of B UE for pain-free [place] reaching. min Manual Therapy:    Rationale:      decrease pain, increase ROM, increase tissue extensibility and decrease trigger points to improve the patient's ability to regain full functional mobility     min Therapeutic Activity:    Rationale:    increase strength, improve coordination and increase proprioception to improve the patients ability to      min Neuromuscular Re-ed:    Rationale:    improve coordination, improve balance and increase proprioception to improve the patients ability to      min Gait Training:    Rationale:       min Patient Education:  YES  Reviewed HEP   []  Progressed/Changed HEP based on: Other Objective/Functional Measures:    See FS per new therex with focus on postural awareness during UE movement. Post Treatment Pain Level (on 0 to 10) scale:   2  / 10     ASSESSMENT  Assessment/Changes in Function:   Patient had pain with post cap stretch & T-Band ext today. Pt able to perform scaption to 90 degrees without inc in discomfort. []  See Progress Note/Recertification   Patient will continue to benefit from skilled PT services to modify and progress therapeutic interventions, address functional mobility deficits, address ROM deficits, address strength deficits, analyze and address soft tissue restrictions, analyze and cue movement patterns, analyze and modify body mechanics/ergonomics and assess and modify postural abnormalities to attain remaining goals. Progress toward goals / Updated goals:    Progressing towards LTG 3.       PLAN  [x]  Upgrade activities as tolerated YES Continue plan of care   []  Discharge due to :    []  Other:      Therapist: MONA Gonzalez    Date: 2/7/2018 Time: 1:57 PM     Future Appointments  Date Time Provider Earnest Barry   2/9/2018 1:00 PM Adolph Mcleod [facility] [place]   2/12/2018 11:30 AM Leanne Whipple PT [facility]   2/16/2018 1:00 PM Leanne Whipple PT [facility]

## 2018-02-09 ENCOUNTER — HOSPITAL ENCOUNTER (OUTPATIENT)
Dept: PHYSICAL THERAPY | Age: 73
Discharge: HOME | End: 2018-02-09
Payer: MEDICARE

## 2018-02-09 PROCEDURE — 97110 THERAPEUTIC EXERCISES: CPT

## 2018-02-09 NOTE — PROGRESS NOTES
PHYSICAL THERAPY - DAILY TREATMENT NOTE    Patient Name: Anatoliy Alfonso        Date: 2018  : 1945   YES Patient  Verified  Visit #:     Insurance: Payor: Mitul Payor / Plan: VA MEDICARE PART A & B / Product Type: Medicare /      In time: 12:05 P Out time: 12:50 P   Total Treatment Time: 45     Medicare Time Tracking (below)   Total Timed Codes (min):  45 1:1 Treatment Time:  40     TREATMENT AREA =  Right shoulder pain [M25.511]  Left shoulder pain [M25.512]    SUBJECTIVE  Pain Level (on 0 to 10 scale):  1-2  / 10   Medication Changes/New allergies or changes in medical history, any new surgeries or procedures? NO    If yes, update Summary List   Subjective Functional Status/Changes:  []  No changes reported     Patient reports that the mobility in her R shoulder is better, but her pain relief is not the same on the R as it was on the L when she got an injection.            OBJECTIVE  Modalities Rationale:     decrease pain to improve patient's ability to return to pain-free lifting using B UE   min [] Estim, type/location:                                      []  att     []  unatt     []  w/US     []  w/ice    []  w/heat    min []  Mechanical Traction: type/lbs                   []  pro   []  sup   []  int   []  cont    []  before manual    []  after manual    min []  Ultrasound, settings/location:      min []  Iontophoresis w/ dexamethasone, location:                                               []  take home patch       []  in clinic   5 min [x]  Ice     []  Heat    location/position: Seated to B shoulders    min []  Vasopneumatic Device, press/temp:     min []  Other:    [] Skin assessment post-treatment (if applicable):    []  intact    []  redness- no adverse reaction     []redness  adverse reaction:        40 min Therapeutic Exercise:  [x]  See flow sheet   Rationale:      increase ROM and increase strength to improve the patients ability to return to pain-free lifting      min Manual Therapy:    Rationale:          min Therapeutic Activity:    Rationale:         min Neuromuscular Re-ed:    Rationale:       min Gait Training:    Rationale:       min Patient Education:  YES  Reviewed HEP   []  Progressed/Changed HEP based on: Other Objective/Functional Measures:    See POC     Post Treatment Pain Level (on 0 to 10) scale:   0  / 10     ASSESSMENT  Assessment/Changes in Function:     Pt unable to tolerate Tband ext with R UE, held foam bottle for S/L ER/ABD on R, no c/o pain with ER, limited by pain on second set with s/L ABD after 1st set      []  See Progress Note/Recertification   Patient will continue to benefit from skilled PT services to modify and progress therapeutic interventions, address functional mobility deficits, address ROM deficits, address strength deficits, analyze and cue movement patterns, analyze and modify body mechanics/ergonomics, assess and modify postural abnormalities and instruct in home and community integration to attain remaining goals.    Progress toward goals / Updated goals:    Progressing towards LTG 1, 2     PLAN  [x]  Upgrade activities as tolerated YES Continue plan of care   []  Discharge due to :    []  Other:      Therapist: Martah Zuniga PT    Date: 2/9/2018 Time: 2:47 PM     Future Appointments  Date Time Provider Earnest Barry   2/12/2018 11:30 AM Martha Zuniga [place] [facility]   2/16/2018 1:00 PM Martha Zuniga PT [facility]

## 2018-02-12 ENCOUNTER — HOSPITAL ENCOUNTER (OUTPATIENT)
Dept: PHYSICAL THERAPY | Age: 73
Discharge: HOME | End: 2018-02-12
Payer: MEDICARE

## 2018-02-12 PROCEDURE — 97110 THERAPEUTIC EXERCISES: CPT

## 2018-02-12 NOTE — PROGRESS NOTES
PHYSICAL THERAPY - DAILY TREATMENT NOTE    Patient Name: Joey Troncoso        Date: 2018  : 1945   YES Patient  Verified  Visit #:   10   of   12  Insurance: Payor: Fabio Beltran / Plan: VA MEDICARE PART A & B / Product Type: Medicare /      In time: 11:30 A Out time: 12:20 P   Total Treatment Time: 50     Medicare Time Tracking (below)   Total Timed Codes (min):  40 1:1 Treatment Time:  40     TREATMENT AREA =  Right shoulder pain [M25.511]  Left shoulder pain [M25.512]    SUBJECTIVE  Pain Level (on 0 to 10 scale):  2  / 10   Medication Changes/New allergies or changes in medical history, any new surgeries or procedures? NO    If yes, update Summary List   Subjective Functional Status/Changes:  []  No changes reported     Patient reports that she is feeling well & R side feels better today.            OBJECTIVE  Modalities Rationale:     decrease pain to improve patient's ability to return to pain-free dressing   min [] Estim, type/location:                                      []  att     []  unatt     []  w/US     []  w/ice    []  w/heat    min []  Mechanical Traction: type/lbs                   []  pro   []  sup   []  int   []  cont    []  before manual    []  after manual    min []  Ultrasound, settings/location:      min []  Iontophoresis w/ dexamethasone, location:                                               []  take home patch       []  in clinic   10 min [x]  Ice     []  Heat    location/position: Supine to B shoulders    min []  Vasopneumatic Device, press/temp:     min []  Other:    [] Skin assessment post-treatment (if applicable):    []  intact    []  redness- no adverse reaction     []redness  adverse reaction:        40 min Therapeutic Exercise:  [x]  See flow sheet   Rationale:      increase ROM and increase strength to improve the patients ability to return to light lifting      min Manual Therapy:    Rationale:          min Therapeutic Activity:    Rationale:         min Neuromuscular Re-ed:    Rationale:       min Gait Training:    Rationale:       min Patient Education:  YES  Reviewed HEP   []  Progressed/Changed HEP based on: Other Objective/Functional Measures:    Modified TE per flow sheet given slight increase in R shoulder pain today  S/L ER/ABD (initially performed with foam bottle, c/o pain & improved tolerance to 0# on R today)     Post Treatment Pain Level (on 0 to 10) scale:   1  / 10     ASSESSMENT  Assessment/Changes in Function:     Slow progress with pain reduction consistently      []  See Progress Note/Recertification   Patient will continue to benefit from skilled PT services to modify and progress therapeutic interventions, address functional mobility deficits, address ROM deficits, address strength deficits, analyze and modify body mechanics/ergonomics, assess and modify postural abnormalities and instruct in home and community integration to attain remaining goals.    Progress toward goals / Updated goals:    Progressing towards LTG 1, 2, 3     PLAN  [x]  Upgrade activities as tolerated YES Continue plan of care   []  Discharge due to :    []  Other:      Therapist: Meagan Craig, PT    Date: 2/12/2018 Time: 3:14 PM     Future Appointments  Date Time Provider Earnest Barry   2/16/2018 1:00 PM Jin Mathis [facility]   2/20/2018 11:00 AM Meagan Craig PT [facility]   2/23/2018 11:30 AM Timothy Dalton [facility]

## 2018-02-16 ENCOUNTER — HOSPITAL ENCOUNTER (OUTPATIENT)
Dept: PHYSICAL THERAPY | Age: 73
Discharge: HOME | End: 2018-02-16
Payer: MEDICARE

## 2018-02-16 PROCEDURE — 97110 THERAPEUTIC EXERCISES: CPT

## 2018-02-16 NOTE — PROGRESS NOTES
PHYSICAL THERAPY - DAILY TREATMENT NOTE    Patient Name: Sam Oseguera        Date: 2018  : 1945   YES Patient  Verified  Visit #:     Insurance: Payor: Shruthi Prows / Plan: [place] MEDICARE PART A & B / Product Type: Medicare /      In time: 1 P Out time: 2:05 P   Total Treatment Time: 60     Medicare Time Tracking (below)   Total Timed Codes (min):  50 1:1 Treatment Time:  25     TREATMENT AREA =  Right shoulder pain [M25.511]  Left shoulder pain [M25.512]    SUBJECTIVE  Pain Level (on 0 to 10 scale):  1-2  / 10   Medication Changes/New allergies or changes in medical history, any new surgeries or procedures? NO    If yes, update Summary List   Subjective Functional Status/Changes:  []  No changes reported     Patient reports average pain level in R at 2/10, 3/10 at worst, 0/10 at best, L shoulder pain is basically fully resolved. Patient reports 95% improvement overall in regards to L shoulder, 80% improvement on the R. She is still having pain with repetitive or prolonged use of R arm out an angle, as with reaching into cabinet.          OBJECTIVE  Modalities Rationale:     decrease pain to improve patient's ability to return to pain-free lifting   min [] Estim, type/location:                                      []  att     []  unatt     []  w/US     []  w/ice    []  w/heat    min []  Mechanical Traction: type/lbs                   []  pro   []  sup   []  int   []  cont    []  before manual    []  after manual    min []  Ultrasound, settings/location:      min []  Iontophoresis w/ dexamethasone, location:                                               []  take home patch       []  in clinic   10 min [x]  Ice     []  Heat    location/position: Supine tO R shoulder     min []  Vasopneumatic Device, press/temp:     min []  Other:    [] Skin assessment post-treatment (if applicable):    []  intact    []  redness- no adverse reaction     []redness  adverse reaction:        50 min Therapeutic Exercise:  [x]  See flow sheet   Rationale:      increase ROM and increase strength to improve the patients ability to return to light lifting      min Manual Therapy:    Rationale:          min Therapeutic Activity:    Rationale:         min Neuromuscular Re-ed:    Rationale:       min Gait Training:    Rationale:       min Patient Education:  YES  Reviewed HEP   []  Progressed/Changed HEP based on: Other Objective/Functional Measures:    R shoulder flex/scaption: 140 def, L 150 deg  MMT L overall 5-/5, R flex, ER 4/5, ABD & IR 4+ to 5-/5 with c/o pain upon MMT of flexion & ABD on R     Post Treatment Pain Level (on 0 to 10) scale:   1  / 10     ASSESSMENT  Assessment/Changes in Function:     Improved strength of R shoulder, L shoulder grossly WFL     []  See Progress Note/Recertification   Patient will continue to benefit from skilled PT services to modify and progress therapeutic interventions, address functional mobility deficits, address ROM deficits, address strength deficits, assess and modify postural abnormalities and instruct in home and community integration to attain remaining goals.    Progress toward goals / Updated goals:    Progressing towards LTG 1, LTG 2, 3, 4 met      PLAN  [x]  Upgrade activities as tolerated YES Continue plan of care   []  Discharge due to :    [x]  Other: PN to MD     Therapist: Leonroa Hedrick PT    Date: 2/16/2018 Time: 1:53 PM     Future Appointments  Date Time Provider Earnest Barry   2/20/2018 11:00 AM Leonora Hedrick PT [facility] [place]   2/23/2018 11:30 AM Timothy De Jesus [facility] [place]

## 2018-02-20 ENCOUNTER — HOSPITAL ENCOUNTER (OUTPATIENT)
Dept: PHYSICAL THERAPY | Age: 73
Discharge: HOME | End: 2018-02-20
Payer: MEDICARE

## 2018-02-20 PROCEDURE — 97110 THERAPEUTIC EXERCISES: CPT

## 2018-02-20 PROCEDURE — G8984 CARRY CURRENT STATUS: HCPCS

## 2018-02-20 PROCEDURE — G8985 CARRY GOAL STATUS: HCPCS

## 2018-02-20 NOTE — PROGRESS NOTES
PHYSICAL THERAPY - DAILY TREATMENT NOTE    Patient Name: Marcela Gordillo        Date: 2018  : 1945   YES Patient  Verified  Visit #:     Insurance: Payor: Ruma Chester / Plan: [place] MEDICARE PART A & B / Product Type: Medicare /      In time: 11 A Out time: 12:02 P   Total Treatment Time: 55     Medicare Time Tracking (below)   Total Timed Codes (min):  55 1:1 Treatment Time:  55     TREATMENT AREA =  Right shoulder pain [M25.511]  Left shoulder pain [M25.512]    SUBJECTIVE  Pain Level (on 0 to 10 scale):  0  / 10   Medication Changes/New allergies or changes in medical history, any new surgeries or procedures?     NO    If yes, update Summary List   Subjective Functional Status/Changes:  []  No changes reported   See re-cert to MD          OBJECTIVE  Modalities Rationale:  PD   min [] Estim, type/location:                                      []  att     []  unatt     []  w/US     []  w/ice    []  w/heat    min []  Mechanical Traction: type/lbs                   []  pro   []  sup   []  int   []  cont    []  before manual    []  after manual    min []  Ultrasound, settings/location:      min []  Iontophoresis w/ dexamethasone, location:                                               []  take home patch       []  in clinic    min []  Ice     []  Heat    location/position:     min []  Vasopneumatic Device, press/temp:     min []  Other:    [] Skin assessment post-treatment (if applicable):    []  intact    []  redness- no adverse reaction     []redness  adverse reaction:        55 min Therapeutic Exercise:  [x]  See flow sheet   Rationale:      increase ROM and increase strength to improve the patients ability to return to light lifting      min Manual Therapy:    Rationale:          min Therapeutic Activity:    Rationale:         min Neuromuscular Re-ed:    Rationale:       min Gait Training:    Rationale:       min Patient Education:  YES  Reviewed HEP   []  Progressed/Changed HEP based on: Other Objective/Functional Measures:    FOTO 60  Added wall push ups, progressed PREs per flow sheet     Post Treatment Pain Level (on 0 to 10) scale:   0  / 10     ASSESSMENT  Assessment/Changes in Function:     Good progress with functional use of B UE, improved tolerance to TE today     []  See Progress Note/Recertification   Patient will continue to benefit from skilled PT services to modify and progress therapeutic interventions, address functional mobility deficits, address ROM deficits, address strength deficits, assess and modify postural abnormalities and instruct in home and community integration to attain remaining goals.    Progress toward goals / Updated goals:    See PN for progress with goals      PLAN  [x]  Upgrade activities as tolerated YES Continue plan of care   []  Discharge due to :    [x]  Other: Re-cert to MD     Therapist: Magaly Bai PT    Date: 2/20/2018 Time: 11:02 AM     Future Appointments  Date Time Provider Earnest Barry   2/23/2018 11:30 AM Timothy Zenia [place]

## 2018-02-20 NOTE — PROGRESS NOTES
[address] [facility]  [facility]  [address] [facility] [address], [address] ,[address], [address]  Phone: [phone]  Fax: [phone] OF CARE/RECERTIFICATION FOR PHYSICAL THERAPY          Patient Name: Ruthann Moncada : 1945   Treatment/Medical Diagnosis: Right shoulder pain [M25.511]  Left shoulder pain [M25.512]   Onset Date: 2017    Referral Source: MD Anuj Castillo MD [facility]): 17   Prior Hospitalization: See Medical History Provider #: [id number]   Prior Level of Function: Manageable sx with ADLs   Comorbidities: HTN, current h/o L wrist pain    Medications: Verified on Patient Summary List   Visits from [facility]: 12 Missed Visits: 1     Goal/Measure of Progress Goal Met? 1. Improve FOTO score from 45 points to > or = 55 points indicating improved tolerance with ADLs in regards to B shoulders   Status at last Eval: 45 Current Status: 60 yes   2. Improve R shoulder AROM for flex/scaption to 140 degrees so ROM is available for dressing activities and/or overhead reaching. Status at last Eval: R 130 deg Current Status: R 140 deg yes   3. Improve overall strength of B shoulder to 4/5 so strength is available for return to light lifting activities at work/home. Status at last Eval: L 4+ to 5-/5  R 3/5 limited by pain Current Status: L overall 5-/5  R flex 4/5  ABD/IR 4+ to 5-/5 yes     Key Functional Changes/Progress: Ms. Bull Silva has made good progress with PT & reports previous c/o L shoulder pain has almost completely resolved. She reports 95% improvement overall in regards to L shoulder pain. R shoulder pain is intermittent in nature (2/10 average, 3/10 at worst, 0/10 at best) & worsens with prolonged use of R shoulder or repetitive reaching overhead or out to the side, although she reports 80% improvement overall in regards to R UE.     Problem List: pain affecting function, decrease ROM, decrease strength, decrease ADL/ functional abilitiies, decrease activity tolerance and other FOTO 60   Treatment Plan may include any combination of the following: Therapeutic exercise, Therapeutic activities, Neuromuscular re-education, Physical agent/modality, Manual therapy, Patient education, Self Care training and Functional mobility training  Patient Goal(s) has been updated and includes:  \"prepare for surgery\"/decrease pain    Goals for this certification period include and are to be achieved in   4  weeks:  1) Improve FOTO score from 60 points to > or = 55 points indicating improved tolerance with ADLs in regards to B shoulders  2) Improve R shoulder AROM for flex/scaption to 140 degrees so ROM is available for dressing activities and/or overhead reaching. 3) Improve overall strength of R shoulder to 5-/5 so strength is available for return to light lifting activities at work/home. 4) Patient to be independent & compliant with HEP in preparation for D/C. Frequency / Duration:   Patient to be seen   2-3   times per week for   4    weeks:  G-Codes (GP): Carry [id number] Current  CK= 40-59%    Goal  CJ= 20-39%. The severity rating is based on the FOTO Score    Assessments/Recommendations: Patient to continue with PT, please indicate your recommendation regarding persistent R shoulder pain. If you have any questions/comments please contact us directly at [phone]. Thank you for allowing us to assist in the care of your patient. Therapist Signature: DORA Ordonez, cert MDT Date: 9/62/5845   Certification Period:  Reporting Period: 2-20-18 to 5-18-18 1-19-18 to 2-20-18 Time: 11:50 AM   NOTE TO PHYSICIAN:  PLEASE COMPLETE THE ORDERS BELOW AND FAX TO   [facility]: [phone].   If you are unable to process this request in 24 hours please contact our office: [phone].    ___ I have read the above report and request that my patient continue as recommended.   ___ I have read the above report and request that my patient continue therapy with the following changes/special instructions: ________________________________________________   ___ I have read the above report and request that my patient be discharged from therapy.      Physician Signature:        Date:       Time:

## 2018-02-23 ENCOUNTER — HOSPITAL ENCOUNTER (OUTPATIENT)
Dept: PHYSICAL THERAPY | Age: 73
Discharge: HOME | End: 2018-02-23
Payer: MEDICARE

## 2018-02-23 PROCEDURE — 97110 THERAPEUTIC EXERCISES: CPT

## 2018-02-28 ENCOUNTER — HOSPITAL ENCOUNTER (OUTPATIENT)
Dept: PHYSICAL THERAPY | Age: 73
Discharge: HOME | End: 2018-02-28
Payer: MEDICARE

## 2018-02-28 PROCEDURE — 97110 THERAPEUTIC EXERCISES: CPT

## 2018-02-28 NOTE — PROGRESS NOTES
PHYSICAL THERAPY - DAILY TREATMENT NOTE    Patient Name: Marcela Gordillo        Date: 2018  : 1945   YES Patient  Verified  Visit #:     Insurance: Payor: Ruma Chester / Plan: VA MEDICARE PART A & B / Product Type: Medicare /      In time: 1:00pm Out time: 2:15pm   Total Treatment Time: 75/65     Medicare Time Tracking (below)   Total Timed Codes (min):  55 1:1 Treatment Time:  35     TREATMENT AREA =  Right shoulder pain [M25.511]  Left shoulder pain [M25.512]  SUBJECTIVE  Pain Level (on 0 to 10 scale):  2  / 10   Medication Changes/New allergies or changes in medical history, any new surgeries or procedures? NO    If yes, update Summary List   Subjective Functional Status/Changes:  []  No changes reported     \"My shoulder feel great.  L shoulder is still a little painful compared to the R.\"      OBJECTIVE  Modalities Rationale:     decrease inflammation, decrease pain and increase tissue extensibility to improve patient's ability to perform functional ADLs   min [] Estim, type/location:                                      []  att     []  unatt     []  w/US     []  w/ice    []  w/heat    min []  Mechanical Traction: type/lbs                   []  pro   []  sup   []  int   []  cont    []  before manual    []  after manual    min []  Ultrasound, settings/location:      min []  Iontophoresis w/ dexamethasone, location:                                               []  take home patch       []  in clinic   10 min [x]  Ice     [x]  Heat    location/position: Supine: CP to B shoulders & MHP to c/s     min []  Vasopneumatic Device, press/temp:     min []  Other:    [] Skin assessment post-treatment (if applicable):    []  intact    []  redness- no adverse reaction     []redness  adverse reaction:        55/35 min Therapeutic Exercise:  [x]  See flow sheet   Rationale:      increase ROM and increase strength to improve the patients ability to regain functional mobility of B shoulder for pain-free OH reaching. min Manual Therapy:    Rationale:      decrease pain, increase ROM, increase tissue extensibility and decrease trigger points to improve the patient's ability to regain full functional mobility     min Therapeutic Activity:    Rationale:    increase strength, improve coordination and increase proprioception to improve the patients ability to      min Neuromuscular Re-ed:    Rationale:    improve coordination, improve balance and increase proprioception to improve the patients ability to      min Gait Training:    Rationale:       min Patient Education:  YES  Reviewed HEP   []  Progressed/Changed HEP based on: Other Objective/Functional Measures:    Progressed to T-Band ext(YTB) with L UE, RTB during supine horiz ABD, and 1lb during R ER/ABD. Post Treatment Pain Level (on 0 to 10) scale:   1  / 10     ASSESSMENT  Assessment/Changes in Function:   Noted inc muscular fatigue following ER/ABD while in sidelying, however no pain/discomfort. []  See Progress Note/Recertification   Patient will continue to benefit from skilled PT services to modify and progress therapeutic interventions, address functional mobility deficits, address ROM deficits, address strength deficits, analyze and address soft tissue restrictions, analyze and cue movement patterns, analyze and modify body mechanics/ergonomics and assess and modify postural abnormalities to attain remaining goals. Progress toward goals / Updated goals:    Progressing towards LTG 2.      PLAN  [x]  Upgrade activities as tolerated YES Continue plan of care   []  Discharge due to :    []  Other:      Therapist: MONA Arellano    Date: 2/28/2018 Time: 3:13 PM     Future Appointments  Date Time Provider Earnest Barry   3/2/2018 10:30 AM Adelaida Saravia, PT [facility] [place]

## 2018-03-02 ENCOUNTER — HOSPITAL ENCOUNTER (OUTPATIENT)
Dept: PHYSICAL THERAPY | Age: 73
Discharge: HOME | End: 2018-03-02
Payer: MEDICARE

## 2018-03-02 PROCEDURE — 97110 THERAPEUTIC EXERCISES: CPT

## 2018-03-02 NOTE — PROGRESS NOTES
PHYSICAL THERAPY - DAILY TREATMENT NOTE    Patient Name: Ja Saenz        Date: 3/2/2018  : 1945   YES Patient  Verified  Visit #:   15   of   24  Insurance: Payor: Alicja Hunter / Plan: VA MEDICARE PART A & B / Product Type: Medicare /      In time: 10:30 A Out time: 11:30 A   Total Treatment Time: 54     Medicare Time Tracking (below)   Total Timed Codes (min):  45 1:1 Treatment Time:  40     TREATMENT AREA =  Right shoulder pain [M25.511]  Left shoulder pain [M25.512]    SUBJECTIVE  Pain Level (on 0 to 10 scale):  4  / 10   Medication Changes/New allergies or changes in medical history, any new surgeries or procedures? NO    If yes, update Summary List   Subjective Functional Status/Changes:  []  No changes reported     Patient reports she is having more pain today, she was reaching back to pull chair in prior to sitting & her R arm slipped & she felt sharp pain into her R arm & has been sore ever since last night.            OBJECTIVE  Modalities Rationale:     decrease pain to improve patient's ability to return to pain-free reaching using R UE   min [] Estim, type/location:                                      []  att     []  unatt     []  w/US     []  w/ice    []  w/heat    min []  Mechanical Traction: type/lbs                   []  pro   []  sup   []  int   []  cont    []  before manual    []  after manual    min []  Ultrasound, settings/location:      min []  Iontophoresis w/ dexamethasone, location:                                               []  take home patch       []  in clinic   10 min [x]  Ice     []  Heat    location/position: B shoulders in supine    min []  Vasopneumatic Device, press/temp:     min []  Other:    [] Skin assessment post-treatment (if applicable):    []  intact    []  redness- no adverse reaction     []redness  adverse reaction:        45/  40 min Therapeutic Exercise:  [x]  See flow sheet   Rationale:      increase ROM and increase strength to improve the patients ability to return to light lifting      min Manual Therapy:    Rationale:          min Therapeutic Activity:    Rationale:         min Neuromuscular Re-ed:    Rationale:       min Gait Training:    Rationale:       min Patient Education:  YES  Reviewed HEP   []  Progressed/Changed HEP based on: Other Objective/Functional Measures:    Modified TE per flow sheet given increase in R UE pain today, added R biceps stretch on wall     Post Treatment Pain Level (on 0 to 10) scale:   2  / 10     ASSESSMENT  Assessment/Changes in Function:     Slight reduction of pain after biceps stretch today, good reduction with modifications to TE today      []  See Progress Note/Recertification   Patient will continue to benefit from skilled PT services to modify and progress therapeutic interventions, address functional mobility deficits, address ROM deficits, address strength deficits, assess and modify postural abnormalities and instruct in home and community integration to attain remaining goals.    Progress toward goals / Updated goals:    Progressing towards LTG 1, 2, 3     PLAN  [x]  Upgrade activities as tolerated YES Continue plan of care   []  Discharge due to :    []  Other:      Therapist: Brian Dewey PT    Date: 3/2/2018 Time: 3:42 PM     Future Appointments  Date Time Provider Earnest Barry   3/6/2018 10:30 AM [facility] [place]   3/9/2018 10:00 [facility] [place]   3/13/2018 10:00 AM [facility] [place]   3/16/2018 10:00 AM Brian Dewey PT [facility] [place]

## 2018-03-06 ENCOUNTER — HOSPITAL ENCOUNTER (OUTPATIENT)
Dept: PHYSICAL THERAPY | Age: 73
Discharge: HOME | End: 2018-03-06
Payer: MEDICARE

## 2018-03-06 PROCEDURE — 97110 THERAPEUTIC EXERCISES: CPT

## 2018-03-06 NOTE — PROGRESS NOTES
PHYSICAL THERAPY - DAILY TREATMENT NOTE    Patient Name: Kathy Garber        Date: 3/6/2018  : 1945   YES Patient  Verified  Visit #:     Insurance: Payor: Leilani Holland / Plan: VA MEDICARE PART A & B / Product Type: Medicare /  0    In time: 10:35am Out time: 11:50am   Total Treatment Time: 75     Medicare Time Tracking (below)   Total Timed Codes (min):  65 1:1 Treatment Time:  40     TREATMENT AREA =  Right shoulder pain [M25.511]  Left shoulder pain [M25.512]  SUBJECTIVE  Pain Level (on 0 to 10 scale):  3  / 10   Medication Changes/New allergies or changes in medical history, any new surgeries or procedures? NO    If yes, update Summary List   Subjective Functional Status/Changes:  []  No changes reported     Pt reports seeing MD yesterday and states the strength testing was very difficult for both shoulders due to weakness.       OBJECTIVE  Modalities Rationale:     decrease inflammation, decrease pain and increase tissue extensibility to improve patient's ability to perform functional ADLs   min [] Estim, type/location:                                      []  att     []  unatt     []  w/US     []  w/ice    []  w/heat    min []  Mechanical Traction: type/lbs                   []  pro   []  sup   []  int   []  cont    []  before manual    []  after manual    min []  Ultrasound, settings/location:      min []  Iontophoresis w/ dexamethasone, location:                                               []  take home patch       []  in clinic   10 min [x]  Ice     []  Heat    location/position: Supine to B shoulder with TR for support.    min []  Vasopneumatic Device, press/temp:     min []  Other:    [] Skin assessment post-treatment (if applicable):    []  intact    []  redness- no adverse reaction     []redness  adverse reaction:        55  /40 min Therapeutic Exercise:  [x]  See flow sheet   Rationale:      increase ROM and increase strength to improve the patients ability to regain functional mobility of B shoulders  for pain-free reaching . min Manual Therapy:    Rationale:      decrease pain, increase ROM, increase tissue extensibility and decrease trigger points to improve the patient's ability to regain full functional mobility     min Therapeutic Activity:    Rationale:    increase strength, improve coordination and increase proprioception to improve the patients ability to      min Neuromuscular Re-ed:    Rationale:    improve coordination, improve balance and increase proprioception to improve the patients ability to      min Gait Training:    Rationale:       min Patient Education:  YES  Reviewed HEP   []  Progressed/Changed HEP based on: Other Objective/Functional Measures:    RTB for T-Band Rows     Post Treatment Pain Level (on 0 to 10) scale:   0  / 10     ASSESSMENT  Assessment/Changes in Function:   Pt able to tolerate today's session with no inc pain and/or discomfort. []  See Progress Note/Recertification   Patient will continue to benefit from skilled PT services to modify and progress therapeutic interventions, address functional mobility deficits, address ROM deficits, address strength deficits, analyze and address soft tissue restrictions, analyze and cue movement patterns, analyze and modify body mechanics/ergonomics and assess and modify postural abnormalities to attain remaining goals. Progress toward goals / Updated goals:    Progressing towards LTG 3.      PLAN  [x]  Upgrade activities as tolerated YES Continue plan of care   []  Discharge due to :    []  Other:      Therapist: MONA Saez    Date: 3/6/2018 Time: 1:13 PM     Future Appointments  Date Time Provider Earnest Barry   3/9/2018 10:00 AM Timothy [facility] [place]   3/13/2018 10:00 AM Timothy [facility] [place]   3/16/2018 10:00 AM Yani Stewart [place] [facility] [place]

## 2018-03-09 ENCOUNTER — HOSPITAL ENCOUNTER (OUTPATIENT)
Dept: PHYSICAL THERAPY | Age: 73
Discharge: HOME | End: 2018-03-09
Payer: MEDICARE

## 2018-03-09 PROCEDURE — 97110 THERAPEUTIC EXERCISES: CPT

## 2018-03-09 NOTE — PROGRESS NOTES
PHYSICAL THERAPY - DAILY TREATMENT NOTE    Patient Name: Cathy Sauceda        Date: 3/9/2018  : 1945   YES Patient  Verified  Visit #:     Insurance: Payor: Mat Like / Plan: [place] MED[place]ARE [place] & B / Product Type: Medicare /  0    In time: 10:00 AM Out time: 11:13 AM   Total Treatment Time: 73     Medicare Time Tracking (below)   Total Timed Codes (min):  63 1:1 Treatment Time:  51     TREATMENT AREA =  Right shoulder pain [M25.511]  Left shoulder pain [M25.512]  SUBJECTIVE  Pain Level (on 0 to 10 scale):  1  / 10   Medication Changes/New allergies or changes in medical history, any new surgeries or procedures? NO    If yes, update Summary List   Subjective Functional Status/Changes:  []  No changes reported     Pt currently reports 1/10 pain, however states that she had dropped her purse yesterday and attempted to catch it with her (R) UE.  Pt noted the pain an 8/10 in the \"biceps area\"     OBJECTIVE  Modalities Rationale:     decrease inflammation, decrease pain and increase tissue extensibility to improve patient's ability to perform functional ADLs   min [] Estim, type/location:                                      []  att     []  unatt     []  w/US     []  w/ice    []  w/heat    min []  Mechanical Traction: type/lbs                   []  pro   []  sup   []  int   []  cont    []  before manual    []  after manual    min []  Ultrasound, settings/location:      min []  Iontophoresis w/ dexamethasone, location:                                               []  take home patch       []  in clinic   10 min [x]  Ice     []  Heat    location/position: Supine to B shoulder with TR for support.    min []  Vasopneumatic Device, press/temp:     min []  Other:    [] Skin assessment post-treatment (if applicable):    []  intact    []  redness- no adverse reaction     []redness  adverse reaction:        63  (51 billed) min Therapeutic Exercise:  [x]  See flow sheet   Rationale:      increase ROM and increase strength to improve the patients ability to regain functional mobility of B shoulders  for pain-free reaching . min Manual Therapy:    Rationale:      decrease pain, increase ROM, increase tissue extensibility and decrease trigger points to improve the patient's ability to regain full functional mobility     min Therapeutic Activity:    Rationale:    increase strength, improve coordination and increase proprioception to improve the patients ability to      min Neuromuscular Re-ed:    Rationale:    improve coordination, improve balance and increase proprioception to improve the patients ability to      min Gait Training:    Rationale:       min Patient Education:  YES  Reviewed HEP   []  Progressed/Changed HEP based on: Other Objective/Functional Measures:    1:1 TE =51'  Reduced resistance with TB rows and used no weight with S/L ER and abd with (R) UE due to pain. Pt able to perform wall push ups. Post Treatment Pain Level (on 0 to 10) scale:   0  / 10     ASSESSMENT  Assessment/Changes in Function:     Pt able to tolerate remaining TE with no inc pain and/or discomfort in (R) UE     []  See Progress Note/Recertification   Patient will continue to benefit from skilled PT services to modify and progress therapeutic interventions, address functional mobility deficits, address ROM deficits, address strength deficits, analyze and address soft tissue restrictions, analyze and cue movement patterns, analyze and modify body mechanics/ergonomics and assess and modify postural abnormalities to attain remaining goals. Progress toward goals / Updated goals:    Progressing towards LTG 3.      PLAN  [x]  Upgrade activities as tolerated YES Continue plan of care   []  Discharge due to :    []  Other:      Therapist: MONA Dexter    Date: 3/9/2018 Time: 12:00 PM     Future Appointments  Date Time Provider Earnest Barry   3/13/2018 10:[place] [facility] [place]   3/16/2018 10:00 AM Moi Gayle, PT [facility] [place]

## 2018-03-13 ENCOUNTER — HOSPITAL ENCOUNTER (OUTPATIENT)
Dept: PHYSICAL THERAPY | Age: 73
Discharge: HOME | End: 2018-03-13
Payer: MEDICARE

## 2018-03-13 PROCEDURE — 97110 THERAPEUTIC EXERCISES: CPT

## 2018-03-13 NOTE — PROGRESS NOTES
PHYSICAL THERAPY - DAILY TREATMENT NOTE    Patient Name: Emilie Maloney        Date: 3/13/2018  : 1945   YES Patient  Verified  Visit #:     Insurance: Payor: Gladis Luque / Plan: [place] MEDICARE PART A & B / Product Type: Medicare /      In time: 10:05am Out time: 10:55am   Total Treatment Time: 50     Medicare Time Tracking (below)   Total Timed Codes (min):  50 1:1 Treatment Time:  25     TREATMENT AREA =  Right shoulder pain [M25.511]  Left shoulder pain [M25.512]  SUBJECTIVE  Pain Level (on 0 to 10 scale):  R:3  L:0  / 10   Medication Changes/New allergies or changes in medical history, any new surgeries or procedures? NO    If yes, update Summary List   Subjective Functional Status/Changes:  []  No changes reported     Pt reports aggravating her R shoulder while closing her car door yesterday and while performing quick movements reaching out in front and to the side. Pt had a massage appt at 11am today.       OBJECTIVE  Modalities Rationale:     TC   min [] Estim, type/location:                                      []  att     []  unatt     []  w/US     []  w/ice    []  w/heat    min []  Mechanical Traction: type/lbs                   []  pro   []  sup   []  int   []  cont    []  before manual    []  after manual    min []  Ultrasound, settings/location:      min []  Iontophoresis w/ dexamethasone, location:                                               []  take home patch       []  in clinic    min []  Ice     []  Heat    location/position:     min []  Vasopneumatic Device, press/temp:     min []  Other:    [] Skin assessment post-treatment (if applicable):    []  intact    []  redness- no adverse reaction     []redness  adverse reaction:        50/25 min Therapeutic Exercise:  [x]  See flow sheet   Rationale:      increase ROM and increase strength to improve the patients ability to regain functional mobility of B shoulders for pain-free lifting.     min Manual Therapy:    Rationale: decrease pain, increase ROM, increase tissue extensibility and decrease trigger points to improve the patient's ability to regain full functional mobility     min Therapeutic Activity:    Rationale:    increase strength, improve coordination and increase proprioception to improve the patients ability to      min Neuromuscular Re-ed:    Rationale:    improve coordination, improve balance and increase proprioception to improve the patients ability to      min Gait Training:    Rationale:       min Patient Education:  YES  Reviewed HEP   []  Progressed/Changed HEP based on: Other Objective/Functional Measures:    TE per FS,    Post Treatment Pain Level (on 0 to 10) scale:   3  / 10     ASSESSMENT  Assessment/Changes in Function:     Pt unable to perfrom T-Band ext with L UE due to anterior shoulder discomfort, however was able to tolerate isometrics with no inc in sx.     []  See Progress Note/Recertification   Patient will continue to benefit from skilled PT services to modify and progress therapeutic interventions, address functional mobility deficits, address ROM deficits, address strength deficits, analyze and address soft tissue restrictions, analyze and cue movement patterns, analyze and modify body mechanics/ergonomics and assess and modify postural abnormalities to attain remaining goals.    Progress toward goals / Updated goals:    Progressing towards LTG 3     PLAN  [x]  Upgrade activities as tolerated YES Continue plan of care   []  Discharge due to :    []  Other:      Therapist: MONA Trevizo    Date: 3/13/2018 Time: 1:24 PM     Future Appointments  Date Time Provider Earnest Barry   3/16/2018 10:00 AM Jojo Woo [place] [facility] [place]   3/21/2018 12:30 PM Sophie Vega [facility] [place]   3/28/2018 10:00 AM Timothy Toney [facility] [place]   3/30/2018 10:00 AM Jojo Woo PT [facility]

## 2018-03-16 ENCOUNTER — HOSPITAL ENCOUNTER (OUTPATIENT)
Dept: PHYSICAL THERAPY | Age: 73
Discharge: HOME | End: 2018-03-16
Payer: MEDICARE

## 2018-03-16 PROCEDURE — 97110 THERAPEUTIC EXERCISES: CPT

## 2018-03-16 NOTE — PROGRESS NOTES
PHYSICAL THERAPY - DAILY TREATMENT NOTE    Patient Name: Xiomara Higgins        Date: 3/16/2018  : 1945   YES Patient  Verified  Visit #:     Insurance: Payor: Hector Hart / Plan: [place] MEDICARE PART A & B / Product Type: Medicare /      In time: 10:05 A Out time: 11 A   Total Treatment Time: 50     Medicare Time Tracking (below)   Total Timed Codes (min):  45 1:1 Treatment Time:  40     TREATMENT AREA =  Right shoulder pain [M25.511]  Left shoulder pain [M25.512]    SUBJECTIVE  Pain Level (on 0 to 10 scale):  0  / 10   Medication Changes/New allergies or changes in medical history, any new surgeries or procedures? NO    If yes, update Summary List   Subjective Functional Status/Changes:  []  No changes reported     Patient reports that the pain in her biceps on the R is very minimal first thing in the morning & progresses the more the she uses her arm during the day.            OBJECTIVE  Modalities Rationale:     decrease pain to improve patient's ability to return to pain-free standing   min [] Estim, type/location:                                      []  att     []  unatt     []  w/US     []  w/ice    []  w/heat    min []  Mechanical Traction: type/lbs                   []  pro   []  sup   []  int   []  cont    []  before manual    []  after manual    min []  Ultrasound, settings/location:      min []  Iontophoresis w/ dexamethasone, location:                                               []  take home patch       []  in clinic   10 min [x]  Ice     []  Heat    location/position: Supine to B shoulders    min []  Vasopneumatic Device, press/temp:     min []  Other:    [] Skin assessment post-treatment (if applicable):    []  intact    []  redness- no adverse reaction     []redness  adverse reaction:        45/  40 min Therapeutic Exercise:  [x]  See flow sheet   Rationale:      increase ROM and increase strength to improve the patients ability to return to light lifting using R UE      min Manual Therapy:    Rationale:          min Therapeutic Activity:    Rationale:         min Neuromuscular Re-ed:    Rationale:       min Gait Training:    Rationale:       min Patient Education:  YES  Reviewed HEP   []  Progressed/Changed HEP based on: Other Objective/Functional Measures:    Modified TE per flow sheet given increase in pain      Post Treatment Pain Level (on 0 to 10) scale:   0  / 10     ASSESSMENT  Assessment/Changes in Function:     Unable to tolerate scaption in standing as well as S/L ER & ABD on R UE due to pain, unable to tolerate gentle biceps stretch on R due to pain     []  See Progress Note/Recertification   Patient will continue to benefit from skilled PT services to modify and progress therapeutic interventions, address functional mobility deficits, address ROM deficits, address strength deficits and instruct in home and community integration to attain remaining goals.    Progress toward goals / Updated goals:    Progressing towards LTGs     PLAN  [x]  Upgrade activities as tolerated YES Continue plan of care   []  Discharge due to :    []  Other:      Therapist: Adria Blankenship PT    Date: 3/16/2018 Time: 11:58 AM     Future Appointments  Date Time Provider Earnest Barry   3/21/2018 12:30 PM Jim Conroy [facility] [place]   3/30/2018 10:00 AM Adria Blankenship PT [facility] [place]

## 2018-03-21 ENCOUNTER — HOSPITAL ENCOUNTER (OUTPATIENT)
Dept: PHYSICAL THERAPY | Age: 73
Discharge: HOME | End: 2018-03-21
Payer: MEDICARE

## 2018-03-21 PROCEDURE — G8985 CARRY GOAL STATUS: HCPCS

## 2018-03-21 PROCEDURE — G8984 CARRY CURRENT STATUS: HCPCS

## 2018-03-21 PROCEDURE — 97110 THERAPEUTIC EXERCISES: CPT

## 2018-03-21 NOTE — PROGRESS NOTES
PHYSICAL THERAPY - DAILY TREATMENT NOTE    Patient Name: Viviane Rome        Date: 3/21/2018  : 1945   YES Patient  Verified  Visit #:     Insurance: Payor: Janneth Wilson / Plan: [place] MEDICARE PART A & B / Product Type: Medicare /      In time: 12:30pm Out time: 1:45pm   Total Treatment Time: 75     Medicare Time Tracking (below)   Total Timed Codes (min):  65 1:1 Treatment Time:  40     TREATMENT AREA =  Right shoulder pain [M25.511]  Left shoulder pain [M25.512]  SUBJECTIVE  Pain Level (on 0 to 10 scale):    / 10   Medication Changes/New allergies or changes in medical history, any new surgeries or procedures? NO    If yes, update Summary List   Subjective Functional Status/Changes:  []  No changes reported     \"My shoulder feel better today. I was able to do a little swimming at the rec center yesterday. \"     OBJECTIVE  Modalities Rationale:     decrease inflammation, decrease pain and increase tissue extensibility to improve patient's ability to perform functional ADLs   min [] Estim, type/location:                                      []  att     []  unatt     []  w/US     []  w/ice    []  w/heat    min []  Mechanical Traction: type/lbs                   []  pro   []  sup   []  int   []  cont    []  before manual    []  after manual    min []  Ultrasound, settings/location:      min []  Iontophoresis w/ dexamethasone, location:                                               []  take home patch       []  in clinic   10 min [x]  Ice     []  Heat    location/position: Supine to B shoulders & R bicep post treatment.     min []  Vasopneumatic Device, press/temp:     min []  Other:    [] Skin assessment post-treatment (if applicable):    []  intact    []  redness- no adverse reaction     []redness  adverse reaction:        65/40 min Therapeutic Exercise:  [x]  See flow sheet   Rationale:      increase ROM and increase strength to improve the patients ability to regain functional mobility of B shoulders for pain-free light lifting/reaching. min Manual Therapy:    Rationale:      decrease pain, increase ROM, increase tissue extensibility and decrease trigger points to improve the patient's ability to regain full functional mobility     min Therapeutic Activity:    Rationale:    increase strength, improve coordination and increase proprioception to improve the patients ability to      min Neuromuscular Re-ed:    Rationale:    improve coordination, improve balance and increase proprioception to improve the patients ability to      min Gait Training:    Rationale:       min Patient Education:  YES  Reviewed HEP   []  Progressed/Changed HEP based on: Other Objective/Functional Measures:    Foto:56    Added gentle R bicep stretch. Post Treatment Pain Level (on 0 to 10) scale:   0  / 10     ASSESSMENT  Assessment/Changes in Function:   See Recert   []  See Progress Note/Recertification   Patient will continue to benefit from skilled PT services to modify and progress therapeutic interventions, address functional mobility deficits, address ROM deficits, address strength deficits, analyze and address soft tissue restrictions, analyze and cue movement patterns, analyze and modify body mechanics/ergonomics and assess and modify postural abnormalities to attain remaining goals.    Progress toward goals / Updated goals:    Progressing towards LTG 3     PLAN  [x]  Upgrade activities as tolerated YES Continue plan of care   []  Discharge due to :    []  Other:      Therapist: MONA Rico    Date: 3/21/2018 Time: 1:38 PM     Future Appointments  Date Time Provider Earnest Barry   3/30/2018 10:00 AM [place], [place] [facility] [place]

## 2018-03-21 NOTE — PROGRESS NOTES
[address] [facility]  [facility]  [address], [address] ,[address], [address]  Phone: [phone]  Fax: [phone] OF CARE/RECERTIFICATION FOR PHYSICAL THERAPY          Patient Name: Ashok Mandujano : 1945   Treatment/Medical Diagnosis: Right shoulder pain [M25.511]  Left shoulder pain [M25.512]   Onset Date: 2017    Referral Source: Giovanny Hamilton MD Start of [facility]): 17   Prior Hospitalization: See Medical History Provider #: [id number]   Prior Level of Function: Manageable sx with ADLs   Comorbidities: HTN, current h/o L wrist pain    Medications: Verified on Patient Summary List   Visits from [facility]: 20 Missed Visits: 0     Goal/Measure of Progress Goal Met? 1. Improve FOTO score from 60 points to > or = 55 points indicating improved tolerance with ADLs in regards to B shoulders   Status at last Eval: 60 Current Status: 56 yes   2. Improve R shoulder AROM for flex/scaption to 140 degrees so ROM is available for dressing activities and/or overhead reaching. Status at last Eval: 140 deg Current Status: 150 deg yes   3. Improve overall strength of R shoulder to 5-/5 so strength is available for return to light lifting activities at work/home. Status at last Eval: L overall 5-/5  R flex 4/5  ABD/IR 4+ to 5-/ Current Status: R flex 5/5  ABD/IR 4+ to 5-/5 Progressing    4. Patient to be independent & compliant with HEP in preparation for D/C. Status at last Eval: na Current Status: independent yes     Key Functional Changes/Progress: Ms. Clarence Canada has made phenomenal progress in PT but still c/o discomfort in R distal bicep and L anterior shoulder. Pt just recently returned to the [facility]-center with light swimming activities and wanted to slowly progress to different classes offered at the facility.  She stated that reaching objects on the top of the fridge has been easier but anything with weight is still challenging such as carrying groceries or milk. Pt will benefit from continue therapy to improve overall strength with ROM gained to perform household chores and return fully to activities at the local Rec[facility]center upon D/C. See below for updated goals. Problem List: pain affecting function, decrease ROM, decrease strength, edema affecting function, decrease ADL/ functional abilitiies, decrease activity tolerance and decrease flexibility/ joint mobility   Treatment Plan may include any combination of the following: Therapeutic exercise, Therapeutic activities, Neuromuscular re-education, Physical agent/modality, Manual therapy, Aquatic therapy, Patient education, Functional mobility training and Home safety training  Patient Goal(s) has been updated and includes:  \"stay away from surgery/improve reaching without pain\"    Goals for this certification period include and are to be achieved in   3-4  weeks:  1)Patient to be independent & compliant with updated HEP upon transitioning to the [facility]-center 2/3x's a week upon D/C.  2) Improve R shoulder AROM for flex/scaption to 155 degrees so ROM is available for dressing activities and/or overhead reaching. 3)Improve overall strength of R shoulder to 5/5 so strength is available for return to light lifting activities at work/home. 4)Pt will report a dec in pain in B shoulders to a </=2/10 overall in regards to carrying tasks at home. Frequency / Duration:   Patient to be seen   1-2   times per week for   3-4   weeks:  G-Codes (GP): G-Codes (GP): Carry [id number] Current  CK= 40-59%   [id number] Goal  CJ= 20-39%. The severity rating is based on the FOTO Score  Assessments/Recommendations: Pt to continue PT 1-2x/week for 3-4 weeks to progress towards LTGs and final HEP upon D/C. If you have any questions/comments please contact us directly at [phone]. Thank you for allowing us to assist in the care of your patient.     Therapist Signature: Taisha Early PTA Date: 9/39/1585   Certification Period:  Reporting Period: 3/21/2018- 6/22/2108 2/20/2018-3/21/2018 Time: 1:54 PM   NOTE TO PHYSICIAN:  PLEASE COMPLETE THE ORDERS BELOW AND FAX TO   [facility]: [phone]. If you are unable to process this request in 24 hours please contact our office: [phone].    ___ I have read the above report and request that my patient continue as recommended.   ___ I have read the above report and request that my patient continue therapy with the following changes/special instructions: ________________________________________________   ___ I have read the above report and request that my patient be discharged from therapy.      Physician Signature:        Date:       Time:

## 2018-03-28 ENCOUNTER — APPOINTMENT (OUTPATIENT)
Dept: PHYSICAL THERAPY | Age: 73
End: 2018-03-28
Payer: MEDICARE

## 2018-03-30 ENCOUNTER — APPOINTMENT (OUTPATIENT)
Dept: PHYSICAL THERAPY | Age: 73
End: 2018-03-30
Payer: MEDICARE

## 2018-04-03 ENCOUNTER — HOSPITAL ENCOUNTER (OUTPATIENT)
Dept: PHYSICAL THERAPY | Age: 73
Discharge: HOME | End: 2018-04-03
Payer: MEDICARE

## 2018-04-03 PROCEDURE — G8985 CARRY GOAL STATUS: HCPCS

## 2018-04-03 PROCEDURE — 97110 THERAPEUTIC EXERCISES: CPT

## 2018-04-03 PROCEDURE — G8986 CARRY D/C STATUS: HCPCS

## 2018-04-03 NOTE — PROGRESS NOTES
PHYSICAL THERAPY - DAILY TREATMENT NOTE    Patient Name: Nadia Block        Date: 4/3/2018  : 1945   YES Patient  Verified  Visit #:     Insurance: Payor: Samm Fraction / Plan: VA MEDICARE PART A & B / Product Type: Medicare /      In time: 12:30 P Out time: 1:30 P   Total Treatment Time: 54     Medicare Time Tracking (below)   Total Timed Codes (min):  55 1:1 Treatment Time:  40     TREATMENT AREA =  Right shoulder pain [M25.511]  Left shoulder pain [M25.512]    SUBJECTIVE  Pain Level (on 0 to 10 scale): 3-4  / 10   Medication Changes/New allergies or changes in medical history, any new surgeries or procedures?     NO    If yes, update Summary List   Subjective Functional Status/Changes:  []  No changes reported     See DC summary          OBJECTIVE  Modalities Rationale:     PD   min [] Estim, type/location:                                      []  att     []  unatt     []  w/US     []  w/ice    []  w/heat    min []  Mechanical Traction: type/lbs                   []  pro   []  sup   []  int   []  cont    []  before manual    []  after manual    min []  Ultrasound, settings/location:      min []  Iontophoresis w/ dexamethasone, location:                                               []  take home patch       []  in clinic    min []  Ice     []  Heat    location/position:     min []  Vasopneumatic Device, press/temp:     min []  Other:    [] Skin assessment post-treatment (if applicable):    []  intact    []  redness- no adverse reaction     []redness  adverse reaction:        55/  40 min Therapeutic Exercise:  [x]  See flow sheet   Rationale:      increase ROM and increase strength to improve the patients ability to return to pain-free ADLs      min Manual Therapy:    Rationale:          min Therapeutic Activity:    Rationale:         min Neuromuscular Re-ed:    Rationale:       min Gait Training:    Rationale:       min Patient Education:  YES  Reviewed HEP   []  Progressed/Changed HEP based on: Other Objective/Functional Measures:    FOTO 60  See DC summary for objective improvements with PT     Post Treatment Pain Level (on 0 to 10) scale:   0  / 10     ASSESSMENT  Assessment/Changes in Function:     Pt indep with program & feels ready for DC     []  See Progress Note/Recertification   Patient will continue to benefit from skilled PT services to instruct in home and community integration to attain remaining goals. Progress toward goals / Updated goals: All LTGs met     PLAN  []  Upgrade activities as tolerated  Continue plan of care   [x]  Discharge due to : Goals met   []  Other:      Therapist: Oziel Cortez, PT    Date: 4/3/2018 Time: 1:00 PM     No future appointments.

## 2018-04-03 NOTE — PROGRESS NOTES
[place] [place] [facility]  [facility] AT [address] [place] [address]  [facility], [address] ,[address], [address]  Phone: [phone]  Fax: [phone] FOR PHYSICAL THERAPY          Patient Name: Nayana Luo : 1945   Treatment/Medical Diagnosis: Right shoulder pain [M25.511]  Left shoulder pain [M25.512]   Onset Date: 2017    Referral Source: Liliana Long MD Start of Care [facility], [place]): 17   Prior Hospitalization: See Medical History Provider #: [id number]   Prior Level of Function: Manageable sx with ADLs   Comorbidities: HTN, current h/o L wrist pain    Medications: Verified on Patient Summary List   Visits from [facility]: 21 Missed Visits: 2     Goal/Measure of Progress Goal Met? 1. Patient to be independent & compliant with updated HEP upon transitioning to the rec-center 2/3x's a week upon D/C. Status at last Eval: HEP established  Current Status: Good compliance with HEP yes   2. Improve R shoulder AROM for flex/scaption to 155 degrees so ROM is available for dressing activities and/or overhead reaching.     Status at last Eval: 150 deg Current Status: WFL, pain-free B shoulder AROM in all planes of motion  yes   3. Improve overall strength of R shoulder to 5/5 so strength is available for return to light lifting activities at work/home. Status at last Eval: R flex 5/5  ABD/IR 4+ to 5-/5 Current Status: Overall L shoulder 5/5  Overall R shoulder 5/5 with the exception of ER 4+ to 5-/5 Partially met   4.   Pt will report a dec in pain in B shoulders to a </=2/10 overall in regards to carrying tasks at home.     Status at last Eval: Limited by pain  R>L Current Status: 1-2/10 at worst on L shoulder, generally pain-free  Average 1-2/10 on R shoulder Partially met      Key Functional Changes/Progress: MsrAbdi Ceja has made good progress with L shoulder & is generally pain-free with pain level at worst at 1/10 with primary c/o \"discomfort & aching\" 95% overall improvement in regards to L shoulder. On R shoulder, she reports average pain level at  1-2/10, 8-9/10 worst on R shoulder with unintentional movements across her body or overhead. B shoulder AROM is now [facility] & pain-free, slight weakness of R shoulder ER, limited by pain. She reports 85-90% improvement overall in regards to R shoulder since initiation of PT is pleased with her progress & feels ready to manage her sx at home. G-Codes (GP): Carry  [id number] Goal  CJ= 20-39%  [id number] D/C  CK= 40-59%. The severity rating is based on the FOTO Score    Assessments/Recommendations: Discontinue therapy. Progressing towards or have reached established goals. If you have any questions/comments please contact us directly at [phone]. Thank you for allowing us to assist in the care of your patient.     Therapist Signature: DORA Huddleston, cert MDT Date: 1-65-68   Reporting Period: 3-21-18 to 4-03-18 Time: 1:00 PM

## 2022-04-18 NOTE — PROGRESS NOTES
Health Maintenance Due   Topic Date Due   • COVID-19 Vaccine (1) Never done   • Annual Physical (ages 3-18)  10/22/2021       Patient is due for topics listed above, he wishes to proceed with Annual Wellness Visit (ages 3-18), but is not proceeding with Immunization(s) COVID-19 at this time. The following has occurred:    PHYSICAL THERAPY - DAILY TREATMENT NOTE    Patient Name: Eladia Kaplan        Date: 2018  : 1945   YES Patient  Verified  Visit #:   15   of   24  Insurance: Payor: Eladia Kendrick / Plan: [place] & B / Product Type: Medicare /      In time: 11:30am Out time: 12:30pm   Total Treatment Time: 60     Medicare Time Tracking (below)   Total Timed Codes (min):  50 1:1 Treatment Time:  40     TREATMENT AREA =  Right shoulder pain [M25.511]  Left shoulder pain [M25.512]  SUBJECTIVE  Pain Level (on 0 to 10 scale):  2  / 10   Medication Changes/New allergies or changes in medical history, any new surgeries or procedures? NO    If yes, update Summary List   Subjective Functional Status/Changes:  []  No changes reported     \"My left shoulder is hurting a little more but my neck is very sore. \"          OBJECTIVE  Modalities Rationale:     decrease inflammation, decrease pain and increase tissue extensibility to improve patient's ability to perform functional ADLs   min [] Estim, type/location:                                      []  att     []  unatt     []  w/US     []  w/ice    []  w/heat    min []  Mechanical Traction: type/lbs                   []  pro   []  sup   []  int   []  cont    []  before manual    []  after manual    min []  Ultrasound, settings/location:      min []  Iontophoresis w/ dexamethasone, location:                                               []  take home patch       []  in clinic   10 min [x]  Ice     []  Heat    location/position: Supine to B shoulder post treatment.     min []  Vasopneumatic Device, press/temp:     min []  Other:    [] Skin assessment post-treatment (if applicable):    []  intact    []  redness- no adverse reaction     []redness  adverse reaction:        50/40 min Therapeutic Exercise:  [x]  See flow sheet   Rationale:      increase ROM and increase strength to improve the patients ability to regain functional mobility of B shoulder for pain-free reaching. min Manual Therapy:    Rationale:      decrease pain, increase ROM, increase tissue extensibility and decrease trigger points to improve the patient's ability to regain full functional mobility     min Therapeutic Activity:    Rationale:    increase strength, improve coordination and increase proprioception to improve the patients ability to      min Neuromuscular Re-ed:    Rationale:    improve coordination, improve balance and increase proprioception to improve the patients ability to      min Gait Training:    Rationale:       min Patient Education:  YES  Reviewed HEP   []  Progressed/Changed HEP based on: Other Objective/Functional Measures:    TE per FS, held on L shoulder sidelying ER due to inc pain. Post Treatment Pain Level (on 0 to 10) scale:   1-2  / 10     ASSESSMENT  Assessment/Changes in Function:   Less cueing required on UT inhibition during therex today. Noted inc discomfort L>R shoulder throughout session. []  See Progress Note/Recertification   Patient will continue to benefit from skilled PT services to modify and progress therapeutic interventions, address functional mobility deficits, address ROM deficits, address strength deficits, analyze and address soft tissue restrictions, analyze and cue movement patterns, analyze and modify body mechanics/ergonomics and assess and modify postural abnormalities to attain remaining goals. Progress toward goals / Updated goals:    Progressing towards newly established LTG's.      PLAN  [x]  Upgrade activities as tolerated YES Continue plan of care   []  Discharge due to :    []  Other:      Therapist: MONA Joy    Date: 2/23/2018 Time: 1:02 PM     Future Appointments  Date Time Provider Earnest Barry   2/28/2018 1:00 PM Pino Owen [facility] [place]   3/2/2018 10:30 AM Karen Bailey, PT [facility] [place]